# Patient Record
Sex: FEMALE
[De-identification: names, ages, dates, MRNs, and addresses within clinical notes are randomized per-mention and may not be internally consistent; named-entity substitution may affect disease eponyms.]

---

## 2020-03-17 ENCOUNTER — APPOINTMENT (OUTPATIENT)
Dept: GYNECOLOGIC ONCOLOGY | Facility: CLINIC | Age: 30
End: 2020-03-17

## 2020-07-09 ENCOUNTER — RESULT REVIEW (OUTPATIENT)
Age: 30
End: 2020-07-09

## 2020-07-09 NOTE — PAST MEDICAL HISTORY
[Menstruating] : The patient is menstruating [Regular Cycle Intervals] : have been regular [Total Preg ___] : G[unfilled] [Live Births ___] : P[unfilled]  [Full Term ___] : Full Term: [unfilled] [Living ___] : Living: [unfilled]

## 2020-07-10 ENCOUNTER — APPOINTMENT (OUTPATIENT)
Dept: GYNECOLOGIC ONCOLOGY | Facility: CLINIC | Age: 30
End: 2020-07-10
Payer: COMMERCIAL

## 2020-07-10 VITALS
SYSTOLIC BLOOD PRESSURE: 102 MMHG | TEMPERATURE: 98.7 F | BODY MASS INDEX: 20.8 KG/M2 | HEIGHT: 62 IN | WEIGHT: 113 LBS | DIASTOLIC BLOOD PRESSURE: 70 MMHG

## 2020-07-10 DIAGNOSIS — N94.9 UNSPECIFIED CONDITION ASSOCIATED WITH FEMALE GENITAL ORGANS AND MENSTRUAL CYCLE: ICD-10-CM

## 2020-07-10 DIAGNOSIS — N76.1 SUBACUTE AND CHRONIC VAGINITIS: ICD-10-CM

## 2020-07-10 DIAGNOSIS — N89.8 OTHER SPECIFIED NONINFLAMMATORY DISORDERS OF VAGINA: ICD-10-CM

## 2020-07-10 DIAGNOSIS — Z86.19 PERSONAL HISTORY OF OTHER INFECTIOUS AND PARASITIC DISEASES: ICD-10-CM

## 2020-07-10 PROCEDURE — 99203 OFFICE O/P NEW LOW 30 MIN: CPT

## 2020-07-10 NOTE — ASSESSMENT
[FreeTextEntry1] : 29 year old with a history of multiple yeast infections and UTI   now presents for further management.  She reports recent concerns about a vaginal discharge and irritation wanting reassurance about the etiology. She has not been sexually active for the past years but is still concerned about possible STD.

## 2020-07-10 NOTE — OB HISTORY
[Menarche Age ____] : age at menarche was [unfilled] [Menopause  Age ____] : menopause occurred at age [unfilled] [Total Preg ___] : : [unfilled]

## 2020-07-10 NOTE — HISTORY OF PRESENT ILLNESS
[FreeTextEntry1] : 29 year old patient (daughter of Dr. Rubalcava) here for health maintenance.  .  No medical issues. No previous surgery.  LMP 20.  Patient has had multiple yeast infections and UTI.  She was seeing a GYN in [facility] and is unhappy with the care.   Patient has been using probiotics to help with symptoms with no improvements. She is concerned about a vaginal discharge and irritation and is requesting my opinion about the etiology and management.

## 2020-12-23 PROBLEM — Z86.19 HISTORY OF CANDIDIASIS OF VAGINA: Status: RESOLVED | Noted: 2020-07-10 | Resolved: 2020-12-23

## 2021-02-11 ENCOUNTER — TRANSCRIPTION ENCOUNTER (OUTPATIENT)
Age: 31
End: 2021-02-11

## 2022-10-07 ENCOUNTER — APPOINTMENT (OUTPATIENT)
Dept: GYNECOLOGIC ONCOLOGY | Facility: CLINIC | Age: 32
End: 2022-10-07

## 2022-10-07 NOTE — HISTORY OF PRESENT ILLNESS
[FreeTextEntry1] : 31 year old patient (daughter of Dr. Rubalcava) here for health maintenance.  .  No medical issues. No previous surgery.  LMP 20.  Patient has had multiple yeast infections and UTI.  [facility].   Patient has been using probiotics to help with symptoms with no improvements. She is concerned about a vaginal discharge and irritation and is requesting my opinion about the etiology and management.

## 2022-10-07 NOTE — ASSESSMENT
[FreeTextEntry1] : 31 year old with a history of multiple yeast infections and UTI   now presents for further management.  She reports recent concerns about a vaginal discharge and irritation wanting reassurance about the etiology. She has not been sexually active for the past years but is still concerned about possible STD.

## 2023-09-26 ENCOUNTER — RESULT REVIEW (OUTPATIENT)
Age: 33
End: 2023-09-26

## 2023-09-26 ENCOUNTER — APPOINTMENT (OUTPATIENT)
Dept: GYNECOLOGIC ONCOLOGY | Facility: CLINIC | Age: 33
End: 2023-09-26
Payer: COMMERCIAL

## 2023-09-26 VITALS
BODY MASS INDEX: 22.63 KG/M2 | HEIGHT: 62 IN | HEART RATE: 79 BPM | DIASTOLIC BLOOD PRESSURE: 80 MMHG | TEMPERATURE: 98.2 F | WEIGHT: 123 LBS | SYSTOLIC BLOOD PRESSURE: 109 MMHG

## 2023-09-26 DIAGNOSIS — Z00.00 ENCOUNTER FOR GENERAL ADULT MEDICAL EXAMINATION W/OUT ABNORMAL FINDINGS: ICD-10-CM

## 2023-09-26 PROCEDURE — 99395 PREV VISIT EST AGE 18-39: CPT

## 2023-10-02 ENCOUNTER — NON-APPOINTMENT (OUTPATIENT)
Age: 33
End: 2023-10-02

## 2023-11-10 ENCOUNTER — NON-APPOINTMENT (OUTPATIENT)
Age: 33
End: 2023-11-10

## 2024-02-07 ENCOUNTER — APPOINTMENT (OUTPATIENT)
Dept: HEMATOLOGY ONCOLOGY | Facility: CLINIC | Age: 34
End: 2024-02-07

## 2024-02-08 NOTE — DISCUSSION/SUMMARY
[FreeTextEntry1] : REASON FOR VISIT: Ms. Alejandrina Rubalcava is a 33-year-old female who was referred by Dr. Bailey Nelson for cancer genetic counseling and risk assessment due to a family history of breast and gastric cancer. The patient was seen on  through [facility]. The patient was unaccompanied.   RELEVANT MEDICAL AND SURGICAL HISTORY: The patient reported no personal history of cancer.   OTHER MEDICAL AND SURGICAL HISTORY: -  None   PAST OB/GYN HISTORY: Obstetrical History:  Age at Menarche: 11 Pre-Menopausal Age at First Live Birth: N/A Oral Contraceptive Use: None Hormone Replacement Therapy: None   CANCER SCREENING HISTORY: Breast: None GYN: Patient reported no abnormalities aside from mild PCOS. Colon: None Stomach: Last endoscopy in  for indigestion issues. She reported no abnormalities. Skin: None.   SOCIAL HISTORY:  Tobacco-product use: None   FAMILY HISTORY: Paternal ancestry was reported as [place] and maternal ancestry was reported as [place]. A detailed family history of cancer was ascertained, see below for pedigree. Of note: - Mother with breast cancer at 61 and a cancer on the neck in her 30s. Her mother was reportedly positive for a "new genetic gene." - Father with gastric cancer at 58. Possible genetic finding. - Maternal aunt with gastric cancer     The remaining family history is unremarkable. According to the patient there are no other known cases of significant cancers in the family. To her knowledge no one else in the family has had germline testing for cancer susceptibility.   RISK ASSESSMENT: Ms. Rubalcava's family history of cancer is suggestive of a hereditary cancer susceptibility syndrome. Variants in breast and gastric cancer susceptibility genes were of specific concern.   We inquired if the patient was able to obtain her parents' genetic reports and/or medical records. We discussed it was difficult to determine what her parents had given the information she was relaying. She stated her mother had a "new breast gene" and her father was treated at [facility] and they had told her and her siblings to be tested for CDH1/CTNNA1 through [facility]. She was offered genetic testing for full comprehensive panels since her parents' records were unavailable for viewing and the patient was unsure if she would be able to forward it along to us.  We recommended genetic testing for a breast/gyn and gastric cancers panel. This test analyzes 32 genes: APC, ANDREW, BARD1, BMPR1A, BRCA1, BRCA2, BRIP1, CDH1, CHEK2, CTNNA1, DICER1, EPCAM, KIT, MLH1, MSH2, MSH6, NF1, PALB2, PDGFRA, PMS2, PTEN, RAD51C, RAD51D, RHBDF2, SDHA, SDHB, SDHC, SDHD, SMAD4, SMARCA4, STK11, TP53   We discussed the risks, benefits and limitations, financial cost and implications of genetic testing. We also discussed the psychosocial implications of genetic testing. Possible test results were reviewed with the patient, along with associated medical management options. The Genetic Information Non-discrimination Act ([place]) was also reviewed.   The patient consented to the above-mentioned genetic testing panel. A sample was obtained from the patient in our clinic and will be sent to [facility] for analysis.   PLAN: 1. The patient's sample will be sent to [facility] for analysis. 2. We will contact the patient once the results are available. Results generally return in 2-3 weeks.   For any additional questions please call Cancer Genetics at [phone] or [phone].     Clara Wiggins MS, [facility] Genetic Counselor, Cancer Genetics

## 2024-02-15 ENCOUNTER — NON-APPOINTMENT (OUTPATIENT)
Age: 34
End: 2024-02-15

## 2024-02-26 NOTE — DISCUSSION/SUMMARY
[FreeTextEntry1] : REASON FOR VISIT: Ms. Tinoco is a 33-year-old female who was called on 2/15/2024 for a discussion regarding her negative genetic test results related to hereditary cancer predisposition.   RELEVANT MEDICAL AND SURGICAL HISTORY: The patient reported no personal history of cancer.  OTHER MEDICAL AND SURGICAL HISTORY: - None  PAST OB/GYN HISTORY: Obstetrical History:  Age at Menarche: 11 Pre-Menopausal Age at First Live Birth: N/A Oral Contraceptive Use: None Hormone Replacement Therapy: None  CANCER SCREENING HISTORY: Breast: None GYN: Patient reported no abnormalities aside from mild PCOS. Colon: None Stomach: Last endoscopy in  for indigestion issues. She reported no abnormalities. Skin: None.  SOCIAL HISTORY:  Tobacco-product use: None  FAMILY HISTORY: Paternal ancestry was reported as [place]ian and maternal ancestry was reported as Ethiopian. A detailed family history of cancer was ascertained, see below for pedigree. Of note: - Mother with breast cancer at 61 and a cancer on the neck in her 30s. Her mother was reportedly positive for a "new genetic gene." - Father with gastric cancer at 58. Possible genetic finding. - Maternal aunt with gastric cancer  The remaining family history is unremarkable. According to the patient there are no other known cases of significant cancers in the family. To her knowledge no one else in the family has had germline testing for cancer susceptibility.   TEST RESULTS: NEGATIVE   NO pathogenic (disease-causing) variants or variants of uncertain significance were detected in the following genes: APC*, ANDREW*, BARD1, BMPR1A, BRCA1, BRCA2, BRIP1, CDH1, CHEK2, CTNNA1, DICER1*, EPCAM*, KIT, MLH1*, MSH2*, MSH6*, NF1*, PALB2, PDGFRA, PMS2*, PTEN*, RAD51C, RAD51D, RHBDF2, SDHA*, SDHB, SDHC*, SDHD, SMAD4, SMARCA4, STK11, TP53   RESULTS INTERPRETATION AND ASSESSMENT: Given Ms. Rubalcava's current reported family history of cancer, and her negative genetic test results, the following screening guidelines and risk-reducing recommendations were discussed: We encouraged the patient to obtain her father's reports. Gastric: The patient was highly encouraged to forward along her father's genetic and pathology report for our review in order to make accurate recommendations. The patient verbalized understanding and stated she would forward along his report. Breast The patient was highly encouraged to forward along her mother's genetic report for our review in order to make accurate recommendations. The patient verbalized understanding and stated she would forward along her mother's report. Other: In the absence of other indications, the patient should practice age-appropriate cancer screening for all other organ systems as recommended for the general population.   It is recommended that the patient discuss the importance of pursuing cancer genetic testing and counseling with her other relatives. There may be a pathogenic variant in the family which the patient did not inherit. We would be happy to meet with her family members or refer them to a genetic expert in their area.   We also discussed that, while no clear cause of the patients personal and family history of cancer was identified, this result, while reassuring, does not entirely rule out a hereditary cancer risk in the patient. It is possible the patient has a mutation in one of the genes tested that is not detectable by this analysis, or has a mutation in a different gene, either known or unknown. It is also possible there is a hereditary cancer predisposition in the family, but the patient did not inherit it.   Our knowledge of genetics and inherited cancer conditions is changing rapidly, therefore, we recommend that the patient contact our office, every 2 to 3 years, to discuss relevant advances in cancer genetics. We emphasize the importance of re-contacting us with updates regarding her personal and family history of cancer as well as any updates regarding additional cancer genetic test results performed for the patient and/or family members.  Such updates could possibly change our risk assessment and recommendations.   PLAN: 1. Long-term management and surveillance should be based on the patients personal and family history and general population guidelines for all other cancers. 2. A copy of the genetic test results is available to the patient in the [employer] portal. 3. The patient was encouraged to contact us every 2-3 years to discuss relevant advances in cancer genetics, or sooner if there are any changes in her personal or family history of cancer.   For any additional questions please call Cancer Genetics at [phone] or [phone].   Clara Wiggins MS, [facility] Genetic Counselor, Cancer Genetics    Addendum 2024: No reports have been received to date from the patient for her family members.

## 2024-10-18 ENCOUNTER — APPOINTMENT (OUTPATIENT)
Dept: GYNECOLOGIC ONCOLOGY | Facility: CLINIC | Age: 34
End: 2024-10-18
Payer: COMMERCIAL

## 2024-10-18 DIAGNOSIS — N76.1 SUBACUTE AND CHRONIC VAGINITIS: ICD-10-CM

## 2024-10-18 DIAGNOSIS — N94.89 OTHER SPECIFIED CONDITIONS ASSOCIATED WITH FEMALE GENITAL ORGANS AND MENSTRUAL CYCLE: ICD-10-CM

## 2024-10-18 DIAGNOSIS — N89.8 OTHER SPECIFIED NONINFLAMMATORY DISORDERS OF VAGINA: ICD-10-CM

## 2024-10-18 PROCEDURE — 99459 PELVIC EXAMINATION: CPT

## 2024-10-18 PROCEDURE — 99213 OFFICE O/P EST LOW 20 MIN: CPT

## 2024-10-24 LAB — CYTOLOGY CVX/VAG DOC THIN PREP: NORMAL

## 2024-10-31 ENCOUNTER — NON-APPOINTMENT (OUTPATIENT)
Age: 34
End: 2024-10-31

## 2025-06-30 ENCOUNTER — NON-APPOINTMENT (OUTPATIENT)
Age: 35
End: 2025-06-30